# Patient Record
Sex: FEMALE | Race: WHITE | Employment: FULL TIME | URBAN - NONMETROPOLITAN AREA
[De-identification: names, ages, dates, MRNs, and addresses within clinical notes are randomized per-mention and may not be internally consistent; named-entity substitution may affect disease eponyms.]

---

## 2022-11-14 NOTE — PROGRESS NOTES
Chief Complaint   Patient presents with    Establish Care     Hasn't seen a physician in 10 years    Anxiety    Migraine    Gastroesophageal Reflux    Obesity       History obtained from the patient. SUBJECTIVE:  Lenon Landau is a 44 y.o. female that presents today for establishing care with new physician, etc. New patient, [facility] to [facility] @ [address]. -Anxiety:   Hx of   Was on zoloft 10 years ago  Off since then  Has episodes of panic  Denies sig depression  No SI/HI    Inciting events or triggers for anxiety - life/stress  Frequency of anxiety - daily  Panic attacks?: yes  Sleep Disturbances? : no  Impaired concentration?: yes  Substance abuse?: no  Suicidal/Homicidal Ideation?: no       -Chronic Headaches:    HPI:  Long hx  They do tend to cluster  Will go several months w/o and then will cluster  Gets aura  Pain not c/w cluster HA  Uses prn excedrine migraine  Has had 2 HA the last wk  Mild HA today  No lateralizing numbness or weakness    Description of headaches - frontal, throbbing  Frequency of Headaches - as above  Level of disability - moderate    Currently on prophylactic therapy? No  Taking abortive therapy? Yes    Associated Aura? Yes  Photophobia, Phonophobia? Yes  Nausea or Vomiting? Yes  Neurologic Symptoms? No  Worse with Valsalva? No  Recent change in Headaches? No  Do headaches awaken her from sleep? No      -GERD:    HPI:    Taking meds as prescribed ?: yes  Tolerating well ?: yes  Side Effects ?: denies  Dysphagia ?: denies  Odynophagia ?: denies  Melena ?: deies  Working on TLCS ?: yes      -Morbid obesity:  Diet not the best  Doesn't exercise  Ready to make changes.        Age/Gender Health Maintenance    Lipid - No results found for: CHOL  No results found for: TRIG  No results found for: HDL  No results found for: LDLCHOLESTEROL, LDLCALC  No results found for: LABVLDL, VLDL  No results found for: CHOLHDLRATIO    DM Screen - No results found for: GLUCOSE    Colon Cancer Screening - age 38  Lung Cancer Screening - never smoker    Tetanus - UTD NOV 2022  Influenza Vaccine - UTD NOV 2022  Pneumonia Vaccine - age 71  Zoster - age 47     Breast Cancer Screening - age 35  Cervical Cancer Screening - pt wants to think about it and discuss next visit (NOV 2022)  Osteoporosis Screening - age 71      Current Outpatient Medications   Medication Sig Dispense Refill    omeprazole (PRILOSEC) 20 MG delayed release capsule Take 20 mg by mouth daily      Aspirin-Acetaminophen-Caffeine (EXCEDRIN MIGRAINE PO) Take by mouth      Naproxen Sodium (ALEVE) 220 MG CAPS Take by mouth PRN      escitalopram (LEXAPRO) 10 MG tablet Take 1 tablet by mouth daily 30 tablet 2    SUMAtriptan (IMITREX) 50 MG tablet Take 1 tab by mouth at onset of migraine. May repeat x 1 in two hours. Max 3 tabs in 24 hours/ 18 tablet 3     No current facility-administered medications for this visit. Orders Placed This Encounter   Medications    escitalopram (LEXAPRO) 10 MG tablet     Sig: Take 1 tablet by mouth daily     Dispense:  30 tablet     Refill:  2    SUMAtriptan (IMITREX) 50 MG tablet     Sig: Take 1 tab by mouth at onset of migraine. May repeat x 1 in two hours. Max 3 tabs in 24 hours/     Dispense:  18 tablet     Refill:  3       All medications reviewed and reconciled, including OTC and herbal medications. Updated list given to patient. Patient Active Problem List    Diagnosis Date Noted    GABRIELA (generalized anxiety disorder) 11/15/2022    Migraine headache 11/15/2022    Morbid obesity ([address] 11/15/2022    GERD (gastroesophageal reflux disease)        Past Medical History:   Diagnosis Date    GABRIELA (generalized anxiety disorder)     GERD (gastroesophageal reflux disease)     Migraine headache     Morbid obesity ([address]        History reviewed. No pertinent surgical history.       Allergies   Allergen Reactions    Pcn [Penicillins]        Social History     Tobacco Use    Smoking status: Never    Smokeless tobacco: Never   Substance Use Topics    Alcohol use: Not Currently       Family History   Problem Relation Age of Onset    Migraines Mother     High Blood Pressure Mother     Mult Sclerosis Father     Colon Cancer Maternal Grandfather 62    Breast Cancer Neg Hx          I have reviewed the patient's past medical history, past surgical history, allergies, medications, social and family history and I have made updates where appropriate. Review of Systems  Positive responses are highlighted in bold    Constitutional:  Fever, Chills, Night Sweats, Fatigue, Unexpected changes in weight  Eyes:  Eye discharge, Eye pain, Eye redness, Visual disturbances   HENT:  Ear pain, Tinnitus, Nosebleeds, Trouble swallowing, Hearing loss, Sore throat  Cardiovascular:  Chest Pain, Palpitations, Orthopnea, Paroxysmal Nocturnal Dyspnea  Respiratory:  Cough, Wheezing, Shortness of breath, Chest tightness, Apnea  Gastrointestinal:  Nausea, Vomiting, Diarrhea, Constipation, Heartburn, Blood in stool  Genitourinary:  Difficulty or painful urination, Flank pain, Change in frequency, Urgency  Skin:  Color change, Rash, Itching, Wound  Psychiatric:  Hallucinations, Anxiety, Depression, Suicidal ideation  Hematological:  Enlarged glands, Easy bleeding, Easily bruising  Musculoskeletal:  Joint pain, Back pain, Gait problems, Joint swelling, Myalgias  Neurological:  Dizziness, Headaches, Presyncope, Numbness, Seizures, Tremors  Allergy:  Environmental allergies, Food allergies  Endocrine:  Heat Intolerance, Cold Intolerance, Polydipsia, Polyphagia, Polyuria      PHYSICAL EXAM:  Vitals:    11/15/22 0914   BP: 124/76   Pulse: 95   Resp: 14   Temp: 97.5 °F (36.4 °C)   TempSrc: Oral   SpO2: 99%   Weight: (!) 302 lb 12.8 oz (137.3 kg)   Height: 5' 7\" (1.702 m)     Body mass index is 47.43 kg/m².        VS Reviewed  General Appearance: A&O x 3, No acute distress,well developed and well- nourished  Head: normocephalic and atraumatic  Eyes: pupils equal, round, and reactive to light, extraocular eye movements intact, conjunctivae and eye lids without erythema  ENT: external ear and ear canal clear bilaterally, TMs intact and regular, nose without deformity, nasal mucosa and turbinates normal without polyps, oropharynx normal, dentition is normal for age  Neck: supple and non-tender without mass, no thyromegaly or thyroid nodules, no cervical lymphadenopathy  Pulmonary/Chest: clear to auscultation bilaterally- no wheezes, rales or rhonchi, normal air movement, no respiratory distress or retractions  Cardiovascular: S1 and S2 auscultated w/ RRR. No murmurs, rubs, clicks, or gallops, distal pulses intact. Abdomen: soft, non-tender, non-distended, bowel sounds physiologic,  no rebound or guarding, no masses or hernias noted. Liver and spleen without enlargement. Extremities: no cyanosis, clubbing or edema of the lower extremities. +2 PT/DP bilaterally. Musculoskeletal: No joint swelling or gross deformity   Psych: Affect appropriate. Mood anxious. Thought process is normal without evidence of depression or psychosis. Good insight and appropriate interaction. Cognition and memory appear to be intact. Skin: warm and dry, no rash or erythema  Lymph:  No cervical, auricular or supraclavicular lymph nodes palpated  Neuro Exam:   Cranial [id number] Intact.    Cranial nerve II: Normal Visual Acuity   Cranial nerve III: Pupils: equal, round, reactive to light   Cranial nerves III, IV, VI: Extraocular Movements: intact   Cranial nerve V: Facial sensation: intact   Cranial nerve VII:Facial strength: intact   Cranial nerve VIII: Hearing: intact   Cranial nerve IX: Palate Elevation intact bilaterally  Cranial nerve XI: Shoulder shrug intact bilaterally  Cranial nerve XII: Tongue movement: normal     Muscle Strength Testing    Deltoid Right 5/5  Left 5/5  Biceps Right 5/5  Left 5/5  Triceps Right 5/5  Left 5/5  Wrist Extensors Right  5/5  Left 5/5   Flexor Digitorum Profundus Right 5/5  Left 5/5  Hand Intrinsics Right 5/5  Left 5/5  Hip Flexors Right 5/5  Left 5/5  Quadriceps Right 5/5  Left 5/5  Anterior Tibialis Right 5/5  Left 5/5  Extensor Hallucis Longus Right 5/5  Left 5/5  Gastrocnemius/Soleus Right 5/5  Left 5/5     Sensory Testing    C5 Right 0=Normal; no sensory loss Left 0=Normal; no sensory loss  C6 Right 0=Normal; no sensory loss Left 0=Normal; no sensory loss  C7 Right 0=Normal; no sensory loss Left 0=Normal; no sensory loss  C8 Right 0=Normal; no sensory loss Left 0=Normal; no sensory loss  T1 Right 0=Normal; no sensory loss Left 0=Normal; no sensory loss    L2 Right 0=Normal; no sensory loss Left 0=Normal; no sensory loss  L3 Right 0=Normal; no sensory loss Left 0=Normal; no sensory loss  L4 Right 0=Normal; no sensory loss Left 0=Normal; no sensory loss  L5 Right 0=Normal; no sensory loss Left 0=Normal; no sensory loss  S1 Right 0=Normal; no sensory loss Left 0=Normal; no sensory loss     Cerebellar Testing    Finger to Nose:  Right  WNL Yes;  Left WNL  Yes  Heel to Rodriguez WNL: Right  WNL  Yes;  Left WNL  Yes     Deep Tendon Reflexes 2/4 equal and symmetric throughout   Clonus:  No  Decreased arm swing No   Shuffling gait No  Narrow base of support No  Able to stand without using arms  Yes  Bradykinesia  No  Tremor No  Increased tone at wrist especially with opening/closing opposite hand  No      ASSESSMENT & PLAN  1. GABRIELA (generalized anxiety disorder)    Worse over time  Trial lexapro 10mg  Reassess 6 wks    - escitalopram (LEXAPRO) 10 MG tablet; Take 1 tablet by mouth daily  Dispense: 30 tablet; Refill: 2    2. Migraine with aura and without status migrainosus, not intractable    Reassuring hx and exam  Trial prn imitrex  Reassess 6 wks    - SUMAtriptan (IMITREX) 50 MG tablet; Take 1 tab by mouth at onset of migraine. May repeat x 1 in two hours. Max 3 tabs in 24 hours/  Dispense: 18 tablet; Refill: 3    3.  Gastroesophageal reflux disease, unspecified whether esophagitis present    Stable  Con't omeprazole    4. Morbid obesity with BMI of 45.0-49.9, adult ([facility] [address]    Discussed wt loss strategies  Labs ordered  F/u 6 wks    - CBC with Auto Differential; Future  - Comprehensive Metabolic Panel; Future  - Lipid Panel; Future  - TSH with Reflex; Future      DISPOSITION    Return in about 6 weeks (around 12/27/2022) for f/u anxiety and headaches, sooner as needed. Irais Niharika released without restrictions. PATIENT COUNSELING    Counseling was provided today regarding the following topics: Healthy eating habits, Regular exercise, substance abuse and healthy sleep habits. Barriers to learning and self management: none    Discussed use, benefit, and side effects of prescribed medications. Barriers to medication compliance addressed. All patient questions answered. Pt voiced understanding.        Electronically signed by Shirlene Lanes, DO on 11/15/2022 at 9:59 AM

## 2022-11-15 ENCOUNTER — OFFICE VISIT (OUTPATIENT)
Dept: FAMILY MEDICINE CLINIC | Age: 37
End: 2022-11-15
Payer: COMMERCIAL

## 2022-11-15 VITALS
OXYGEN SATURATION: 99 % | SYSTOLIC BLOOD PRESSURE: 124 MMHG | WEIGHT: 293 LBS | HEIGHT: 67 IN | RESPIRATION RATE: 14 BRPM | DIASTOLIC BLOOD PRESSURE: 76 MMHG | TEMPERATURE: 97.5 F | HEART RATE: 95 BPM | BODY MASS INDEX: 45.99 KG/M2

## 2022-11-15 DIAGNOSIS — E66.01 MORBID OBESITY WITH BMI OF 45.0-49.9, ADULT (HCC): ICD-10-CM

## 2022-11-15 DIAGNOSIS — F41.1 GAD (GENERALIZED ANXIETY DISORDER): Primary | ICD-10-CM

## 2022-11-15 DIAGNOSIS — K21.9 GASTROESOPHAGEAL REFLUX DISEASE, UNSPECIFIED WHETHER ESOPHAGITIS PRESENT: ICD-10-CM

## 2022-11-15 DIAGNOSIS — G43.109 MIGRAINE WITH AURA AND WITHOUT STATUS MIGRAINOSUS, NOT INTRACTABLE: ICD-10-CM

## 2022-11-15 PROBLEM — G43.909 MIGRAINE HEADACHE: Chronic | Status: ACTIVE | Noted: 2022-11-15

## 2022-11-15 PROBLEM — G43.909 MIGRAINE HEADACHE: Status: ACTIVE | Noted: 2022-11-15

## 2022-11-15 PROCEDURE — 90674 CCIIV4 VAC NO PRSV 0.5 ML IM: CPT | Performed by: FAMILY MEDICINE

## 2022-11-15 PROCEDURE — 99204 OFFICE O/P NEW MOD 45 MIN: CPT | Performed by: FAMILY MEDICINE

## 2022-11-15 PROCEDURE — 90472 IMMUNIZATION ADMIN EACH ADD: CPT | Performed by: FAMILY MEDICINE

## 2022-11-15 PROCEDURE — 90715 TDAP VACCINE 7 YRS/> IM: CPT | Performed by: FAMILY MEDICINE

## 2022-11-15 PROCEDURE — 90471 IMMUNIZATION ADMIN: CPT | Performed by: FAMILY MEDICINE

## 2022-11-15 RX ORDER — ESCITALOPRAM OXALATE 10 MG/1
10 TABLET ORAL DAILY
Qty: 30 TABLET | Refills: 2 | Status: SHIPPED | OUTPATIENT
Start: 2022-11-15

## 2022-11-15 RX ORDER — SUMATRIPTAN 50 MG/1
TABLET, FILM COATED ORAL
Qty: 18 TABLET | Refills: 3 | Status: SHIPPED | OUTPATIENT
Start: 2022-11-15

## 2022-11-15 RX ORDER — COVID-19 ANTIGEN TEST
KIT MISCELLANEOUS
COMMUNITY

## 2022-11-15 RX ORDER — OMEPRAZOLE 20 MG/1
20 CAPSULE, DELAYED RELEASE ORAL DAILY
COMMUNITY

## 2022-11-15 ASSESSMENT — PATIENT HEALTH QUESTIONNAIRE - PHQ9
SUM OF ALL RESPONSES TO PHQ QUESTIONS 1-9: 1
SUM OF ALL RESPONSES TO PHQ QUESTIONS 1-9: 1
SUM OF ALL RESPONSES TO PHQ9 QUESTIONS 1 & 2: 1
2. FEELING DOWN, DEPRESSED OR HOPELESS: 1
1. LITTLE INTEREST OR PLEASURE IN DOING THINGS: 0
SUM OF ALL RESPONSES TO PHQ QUESTIONS 1-9: 1
SUM OF ALL RESPONSES TO PHQ QUESTIONS 1-9: 1

## 2022-11-15 NOTE — PROGRESS NOTES
Vaccine Information Sheet, \"Influenza - Inactivated\"  given to Judge Khan, or parent/legal guardian of  Judge Khan and verbalized understanding. Patient responses:    Have you ever had a reaction to a flu vaccine? No  Do you have an allergy to eggs, neomycin or polymixin? No  Do you have an allergy to Thimerosal, contact lens solution, or Merthiolate? No  Have you ever had Guillian [place] Syndrome? No  Do you have any current illness? No  Do you have a temperature above 100 degrees? No  Are you pregnant? No  If pregnant, permission obtained from physician? No  Do you have an active neurological disorder? No      Flu vaccine given per order. Please see immunization tab.

## 2022-11-15 NOTE — PATIENT INSTRUCTIONS
LAB INSTRUCTIONS:    Please complete labs within 4 week(s). Please fast for 8 hours prior to lab collection. The clinic will call you within 1 week of collection. If you have not heard from us within that amount of time, please call us at [phone].

## 2022-12-09 ENCOUNTER — NURSE ONLY (OUTPATIENT)
Dept: LAB | Age: 37
End: 2022-12-09

## 2022-12-09 DIAGNOSIS — E66.01 MORBID OBESITY WITH BMI OF 45.0-49.9, ADULT (HCC): ICD-10-CM

## 2022-12-09 LAB
ALBUMIN SERPL-MCNC: 4 G/DL (ref 3.5–5.1)
ALP BLD-CCNC: 65 U/L (ref 38–126)
ALT SERPL-CCNC: 28 U/L (ref 11–66)
ANION GAP SERPL CALCULATED.3IONS-SCNC: 10 MEQ/L (ref 8–16)
AST SERPL-CCNC: 15 U/L (ref 5–40)
BASOPHILS # BLD: 0.4 %
BASOPHILS ABSOLUTE: 0 THOU/MM3 (ref 0–0.1)
BILIRUB SERPL-MCNC: 0.6 MG/DL (ref 0.3–1.2)
BUN BLDV-MCNC: 17 MG/DL (ref 7–22)
CALCIUM SERPL-MCNC: 8.7 MG/DL (ref 8.5–10.5)
CHLORIDE BLD-SCNC: 102 MEQ/L (ref 98–111)
CHOLESTEROL, TOTAL: 178 MG/DL (ref 100–199)
CO2: 24 MEQ/L (ref 23–33)
CREAT SERPL-MCNC: 0.6 MG/DL (ref 0.4–1.2)
EOSINOPHIL # BLD: 0.7 %
EOSINOPHILS ABSOLUTE: 0 THOU/MM3 (ref 0–0.4)
ERYTHROCYTE [DISTWIDTH] IN BLOOD BY AUTOMATED COUNT: 12.2 % (ref 11.5–14.5)
ERYTHROCYTE [DISTWIDTH] IN BLOOD BY AUTOMATED COUNT: 41.5 FL (ref 35–45)
GFR SERPL CREATININE-BSD FRML MDRD: > 60 ML/MIN/1.73M2
GLUCOSE BLD-MCNC: 96 MG/DL (ref 70–108)
HCT VFR BLD CALC: 44.7 % (ref 37–47)
HDLC SERPL-MCNC: 56 MG/DL
HEMOGLOBIN: 14.6 GM/DL (ref 12–16)
IMMATURE GRANS (ABS): 0.02 THOU/MM3 (ref 0–0.07)
IMMATURE GRANULOCYTES: 0.4 %
LDL CHOLESTEROL CALCULATED: 104 MG/DL
LYMPHOCYTES # BLD: 22.4 %
LYMPHOCYTES ABSOLUTE: 1.3 THOU/MM3 (ref 1–4.8)
MCH RBC QN AUTO: 30.1 PG (ref 26–33)
MCHC RBC AUTO-ENTMCNC: 32.7 GM/DL (ref 32.2–35.5)
MCV RBC AUTO: 92.2 FL (ref 81–99)
MONOCYTES # BLD: 10.8 %
MONOCYTES ABSOLUTE: 0.6 THOU/MM3 (ref 0.4–1.3)
NUCLEATED RED BLOOD CELLS: 0 /100 WBC
PLATELET # BLD: 263 THOU/MM3 (ref 130–400)
PMV BLD AUTO: 11 FL (ref 9.4–12.4)
POTASSIUM SERPL-SCNC: 4.3 MEQ/L (ref 3.5–5.2)
RBC # BLD: 4.85 MILL/MM3 (ref 4.2–5.4)
SEG NEUTROPHILS: 65.3 %
SEGMENTED NEUTROPHILS ABSOLUTE COUNT: 3.7 THOU/MM3 (ref 1.8–7.7)
SODIUM BLD-SCNC: 136 MEQ/L (ref 135–145)
TOTAL PROTEIN: 5.9 G/DL (ref 6.1–8)
TRIGL SERPL-MCNC: 91 MG/DL (ref 0–199)
TSH SERPL DL<=0.05 MIU/L-ACNC: 2.47 UIU/ML (ref 0.4–4.2)
WBC # BLD: 5.7 THOU/MM3 (ref 4.8–10.8)

## 2022-12-12 ENCOUNTER — TELEPHONE (OUTPATIENT)
Dept: FAMILY MEDICINE CLINIC | Age: 37
End: 2022-12-12

## 2022-12-12 NOTE — TELEPHONE ENCOUNTER
----- Message from Nick Dolan, DO sent at 12/11/2022  9:06 AM EST -----  Please let pt know that labs look good  No concerning findings. Let me know if questions, thanks!

## 2023-02-14 SDOH — ECONOMIC STABILITY: TRANSPORTATION INSECURITY
IN THE PAST 12 MONTHS, HAS LACK OF TRANSPORTATION KEPT YOU FROM MEETINGS, WORK, OR FROM GETTING THINGS NEEDED FOR DAILY LIVING?: NO

## 2023-02-14 SDOH — ECONOMIC STABILITY: FOOD INSECURITY: WITHIN THE PAST 12 MONTHS, THE FOOD YOU BOUGHT JUST DIDN'T LAST AND YOU DIDN'T HAVE MONEY TO GET MORE.: NEVER TRUE

## 2023-02-14 SDOH — ECONOMIC STABILITY: FOOD INSECURITY: WITHIN THE PAST 12 MONTHS, YOU WORRIED THAT YOUR FOOD WOULD RUN OUT BEFORE YOU GOT MONEY TO BUY MORE.: NEVER TRUE

## 2023-02-14 SDOH — ECONOMIC STABILITY: HOUSING INSECURITY
IN THE LAST 12 MONTHS, WAS THERE A TIME WHEN YOU DID NOT HAVE A STEADY PLACE TO SLEEP OR SLEPT IN A SHELTER (INCLUDING NOW)?: NO

## 2023-02-14 SDOH — ECONOMIC STABILITY: INCOME INSECURITY: HOW HARD IS IT FOR YOU TO PAY FOR THE VERY BASICS LIKE FOOD, HOUSING, MEDICAL CARE, AND HEATING?: NOT VERY HARD

## 2023-02-14 NOTE — PROGRESS NOTES
Chief Complaint   Patient presents with    Follow-up     Anxiety, migraines and wt       History obtained from the patient. SUBJECTIVE:  Doc Valenzuela is a 44 y.o. female that presents today for     -Anxiety LAST VISIT:   Hx of   Was on zoloft 10 years ago  Off since then  Has episodes of panic  Denies sig depression  No SI/HI    Inciting events or triggers for anxiety - life/stress  Frequency of anxiety - daily  Panic attacks?: yes  Sleep Disturbances? : no  Impaired concentration?: yes  Substance abuse?: no  Suicidal/Homicidal Ideation?: no     UPDATE TODAY:   Started on Lexapro 10mg last visit  Good response thus far  Still having some anxiety, however  Would like to inc dose  No side effects       -Chronic Headaches LAST VISIT:    HPI:  Long hx  They do tend to cluster  Will go several months w/o and then will cluster  Gets aura  Pain not c/w cluster HA  Uses prn excedrine migraine  Has had 2 HA the last wk  Mild HA today  No lateralizing numbness or weakness    Description of headaches - frontal, throbbing  Frequency of Headaches - as above  Level of disability - moderate    Currently on prophylactic therapy? No  Taking abortive therapy? Yes    Associated Aura? Yes  Photophobia, Phonophobia? Yes  Nausea or Vomiting? Yes  Neurologic Symptoms? No  Worse with Valsalva? No  Recent change in Headaches? No  Do headaches awaken her from sleep? No    UPDATE TODAY:   SWARTZ's doing better  2 in the last 6 wks  Imitrex helpful       -Morbid obesity:  Diet not the best  Doesn't exercise  Ready to make changes.    Labs appropriate      Age/Gender Health Maintenance    Lipid -   Lab Results   Component Value Date    CHOL 178 12/09/2022     Lab Results   Component Value Date    TRIG 91 12/09/2022     Lab Results   Component Value Date    HDL 56 12/09/2022     Lab Results   Component Value Date    LDLCALC 104 12/09/2022     No results found for: LABVLDL, VLDL  No results found for: CHOLHDLRATIO      DM Screen - Lab Results   Component Value Date/Time    GLUCOSE 96 12/09/2022 08:33 AM       Colon Cancer Screening - age 38  Lung Cancer Screening - never smoker    Tetanus - UTD NOV 2022  Influenza Vaccine - UTD NOV 2022  Pneumonia Vaccine - age 71  Zoster - age 47     Breast Cancer Screening - age 35  Cervical Cancer Screening - pt wants to think about it and discuss next visit (NOV 2022/FEB 2023)  Osteoporosis Screening - age 71      Current Outpatient Medications   Medication Sig Dispense Refill    escitalopram (LEXAPRO) 20 MG tablet Take 1 tablet by mouth daily 30 tablet 2    omeprazole (PRILOSEC) 20 MG delayed release capsule Take 20 mg by mouth daily      Aspirin-Acetaminophen-Caffeine (EXCEDRIN MIGRAINE PO) Take by mouth      Naproxen Sodium 220 MG CAPS Take by mouth PRN      SUMAtriptan (IMITREX) 50 MG tablet Take 1 tab by mouth at onset of migraine. May repeat x 1 in two hours. Max 3 tabs in 24 hours/ 18 tablet 3     No current facility-administered medications for this visit. Orders Placed This Encounter   Medications    escitalopram (LEXAPRO) 20 MG tablet     Sig: Take 1 tablet by mouth daily     Dispense:  30 tablet     Refill:  2         All medications reviewed and reconciled, including OTC and herbal medications. Updated list given to patient. Patient Active Problem List    Diagnosis Date Noted    GABRIELA (generalized anxiety disorder) 11/15/2022    Migraine headache 11/15/2022    Morbid obesity ([address] 11/15/2022    GERD (gastroesophageal reflux disease)        Past Medical History:   Diagnosis Date    GABRIELA (generalized anxiety disorder)     GERD (gastroesophageal reflux disease)     Migraine headache     Morbid obesity ([address]        History reviewed. No pertinent surgical history.       Allergies   Allergen Reactions    Pcn [Penicillins]        Social History     Tobacco Use    Smoking status: Never    Smokeless tobacco: Never   Substance Use Topics    Alcohol use: Not Currently       Family History Problem Relation Age of Onset    Migraines Mother     High Blood Pressure Mother     Mult Sclerosis Father     Colon Cancer Maternal Grandfather 62    Breast Cancer Neg Hx          I have reviewed the patient's past medical history, past surgical history, allergies, medications, social and family history and I have made updates where appropriate. Review of Systems  Positive responses are highlighted in bold    Constitutional:  Fever, Chills, Night Sweats, Fatigue, Unexpected changes in weight  HENT:  Ear pain, Tinnitus, Nosebleeds, Trouble swallowing, Hearing loss, Sore throat  Cardiovascular:  Chest Pain, Palpitations, Orthopnea, Paroxysmal Nocturnal Dyspnea  Respiratory:  Cough, Wheezing, Shortness of breath, Chest tightness, Apnea  Gastrointestinal:  Nausea, Vomiting, Diarrhea, Constipation, Heartburn, Blood in stool  Genitourinary:  Difficulty or painful urination, Flank pain, Change in frequency, Urgency  Skin:  Color change, Rash, Itching, Wound  Musculoskeletal:  Joint pain, Back pain, Gait problems, Joint swelling, Myalgias  Neurological:  Dizziness, Headaches, Presyncope, Numbness, Seizures, Tremors  Endocrine:  Heat Intolerance, Cold Intolerance, Polydipsia, Polyphagia, Polyuria      PHYSICAL EXAM:  Vitals:    02/15/23 0839   BP: 130/82   Pulse: 78   Resp: 16   Temp: 98.2 °F (36.8 °C)   SpO2: 99%   Weight: 290 lb (131.5 kg)   Height: 5' 7\" (1.702 m)     Body mass index is 45.42 kg/m².        VS Reviewed  General Appearance: A&O x 3, No acute distress,well developed and well- nourished  Eyes: pupils equal, round, and reactive to light, extraocular eye movements intact, conjunctivae and eye lids without erythema  ENT: external ear and ear canal clear bilaterally, TMs intact and regular, nose without deformity, nasal mucosa and turbinates normal without polyps, oropharynx normal, dentition is normal for age  Neck: supple and non-tender without mass, no thyromegaly or thyroid nodules, no cervical lymphadenopathy  Pulmonary/Chest: clear to auscultation bilaterally- no wheezes, rales or rhonchi, normal air movement, no respiratory distress or retractions  Cardiovascular: S1 and S2 auscultated w/ RRR. No murmurs, rubs, clicks, or gallops, distal pulses intact. Abdomen: soft, non-tender, non-distended, bowel sounds physiologic,  no rebound or guarding, no masses or hernias noted. Liver and spleen without enlargement. Extremities: no cyanosis, clubbing or edema of the lower extremities. Skin: warm and dry, no rash or erythema  Psych: Affect appropriate. Mood mildly anxious. Thought process is normal without evidence of depression or psychosis. Good insight and appropriate interaction. Cognition and memory appear to be intact. ASSESSMENT & PLAN  1. GABRIELA (generalized anxiety disorder)    Good response thus far but incomplete  Inc lexapro to 20mg  Reassess 6 wks    - escitalopram (LEXAPRO) 20 MG tablet; Take 1 tablet by mouth daily  Dispense: 30 tablet; Refill: 2    2. Migraine with aura and without status migrainosus, not intractable    Doing better  Con't prn imitrex    3. Morbid obesity with BMI of 45.0-49.9, adult ([facility] [address]    Discussed wt loss strategies today      DISPOSITION    Return in about 6 weeks (around 3/29/2023) for f/u anxiety and inc lexapro dose, sooner as needed. Emilee Ambrosia released without restrictions. PATIENT COUNSELING    Counseling was provided today regarding the following topics: Healthy eating habits, Regular exercise, substance abuse and healthy sleep habits. Barriers to learning and self management: none    Discussed use, benefit, and side effects of prescribed medications. Barriers to medication compliance addressed. All patient questions answered. Pt voiced understanding.        Electronically signed by Storm [place] DO on 2/15/2023 at 8:55 AM

## 2023-02-15 ENCOUNTER — OFFICE VISIT (OUTPATIENT)
Dept: FAMILY MEDICINE CLINIC | Age: 38
End: 2023-02-15
Payer: COMMERCIAL

## 2023-02-15 VITALS
BODY MASS INDEX: 45.52 KG/M2 | DIASTOLIC BLOOD PRESSURE: 82 MMHG | WEIGHT: 290 LBS | HEIGHT: 67 IN | TEMPERATURE: 98.2 F | OXYGEN SATURATION: 99 % | SYSTOLIC BLOOD PRESSURE: 130 MMHG | HEART RATE: 78 BPM | RESPIRATION RATE: 16 BRPM

## 2023-02-15 DIAGNOSIS — E66.01 MORBID OBESITY WITH BMI OF 45.0-49.9, ADULT (HCC): ICD-10-CM

## 2023-02-15 DIAGNOSIS — F41.1 GAD (GENERALIZED ANXIETY DISORDER): Primary | ICD-10-CM

## 2023-02-15 DIAGNOSIS — G43.109 MIGRAINE WITH AURA AND WITHOUT STATUS MIGRAINOSUS, NOT INTRACTABLE: ICD-10-CM

## 2023-02-15 PROCEDURE — 99214 OFFICE O/P EST MOD 30 MIN: CPT | Performed by: FAMILY MEDICINE

## 2023-02-15 RX ORDER — ESCITALOPRAM OXALATE 20 MG/1
20 TABLET ORAL DAILY
Qty: 30 TABLET | Refills: 2 | Status: SHIPPED | OUTPATIENT
Start: 2023-02-15

## 2023-02-15 ASSESSMENT — PATIENT HEALTH QUESTIONNAIRE - PHQ9
SUM OF ALL RESPONSES TO PHQ QUESTIONS 1-9: 0
SUM OF ALL RESPONSES TO PHQ QUESTIONS 1-9: 0
1. LITTLE INTEREST OR PLEASURE IN DOING THINGS: 0
2. FEELING DOWN, DEPRESSED OR HOPELESS: 0
SUM OF ALL RESPONSES TO PHQ9 QUESTIONS 1 & 2: 0
SUM OF ALL RESPONSES TO PHQ QUESTIONS 1-9: 0
SUM OF ALL RESPONSES TO PHQ QUESTIONS 1-9: 0

## 2023-10-02 NOTE — PROGRESS NOTES
Chief Complaint   Patient presents with    Follow-up     Issues noted below     History obtained from the patient. SUBJECTIVE:  Alvira Boast is a 43 y.o. female that presents today for     -Anxiety PRIOR VISIT:   Hx of   Was on zoloft 10 years ago  Off since then  Has episodes of panic  Denies sig depression  No SI/HI    Inciting events or triggers for anxiety - life/stress  Frequency of anxiety - daily  Panic attacks?: yes  Sleep Disturbances? : no  Impaired concentration?: yes  Substance abuse?: no  Suicidal/Homicidal Ideation?: no     UPDATE LAST VISIT:   Started on Lexapro 10mg last visit  Good response thus far  Still having some anxiety, however  Would like to inc dose  No side effects     UPDATE TODAY:   Overall doing ok  On lexapro 20  Has some inc stress at home, so having some inc panic recently  Doesn't want to change lexapro  Is asking to try something prn  No SI/HI       -Chronic Headaches PRIOR VISIT:    HPI:  Long hx  They do tend to cluster  Will go several months w/o and then will cluster  Gets aura  Pain not c/w cluster HA  Uses prn excedrine migraine  Has had 2 HA the last wk  Mild HA today  No lateralizing numbness or weakness    Description of headaches - frontal, throbbing  Frequency of Headaches - as above  Level of disability - moderate    Currently on prophylactic therapy? No  Taking abortive therapy? Yes    Associated Aura? Yes  Photophobia, Phonophobia? Yes  Nausea or Vomiting? Yes  Neurologic Symptoms? No  Worse with Valsalva? No  Recent change in Headaches? No  Do headaches awaken her from sleep?   No    UPDATE LAST VISIT:   SWARTZ's doing better  2 in the last 6 wks  Imitrex helpful     UPDATE TODAY:   Having 3 migraines per month, aborts with imitrex  However, over the last 6 months having headaches more days than not  Takes quite a bit of excedrine for these, helps but them HA's come back  Those are mild, frontal, but annoying  No lateralizing numbness or weakness  Not

## 2023-10-03 ENCOUNTER — OFFICE VISIT (OUTPATIENT)
Dept: FAMILY MEDICINE CLINIC | Age: 38
End: 2023-10-03
Payer: COMMERCIAL

## 2023-10-03 VITALS
WEIGHT: 282 LBS | DIASTOLIC BLOOD PRESSURE: 74 MMHG | HEART RATE: 75 BPM | HEIGHT: 67 IN | OXYGEN SATURATION: 98 % | SYSTOLIC BLOOD PRESSURE: 122 MMHG | TEMPERATURE: 98 F | RESPIRATION RATE: 16 BRPM | BODY MASS INDEX: 44.26 KG/M2

## 2023-10-03 DIAGNOSIS — K21.9 GASTROESOPHAGEAL REFLUX DISEASE, UNSPECIFIED WHETHER ESOPHAGITIS PRESENT: ICD-10-CM

## 2023-10-03 DIAGNOSIS — F41.1 GAD (GENERALIZED ANXIETY DISORDER): Primary | ICD-10-CM

## 2023-10-03 DIAGNOSIS — G44.40 ANALGESIC REBOUND HEADACHE: ICD-10-CM

## 2023-10-03 DIAGNOSIS — Z23 NEEDS FLU SHOT: ICD-10-CM

## 2023-10-03 DIAGNOSIS — E66.01 MORBID OBESITY WITH BMI OF 40.0-44.9, ADULT (HCC): ICD-10-CM

## 2023-10-03 DIAGNOSIS — T39.95XA ANALGESIC REBOUND HEADACHE: ICD-10-CM

## 2023-10-03 DIAGNOSIS — F41.0 PANIC ATTACKS: ICD-10-CM

## 2023-10-03 DIAGNOSIS — G43.109 MIGRAINE WITH AURA AND WITHOUT STATUS MIGRAINOSUS, NOT INTRACTABLE: ICD-10-CM

## 2023-10-03 PROCEDURE — 99214 OFFICE O/P EST MOD 30 MIN: CPT | Performed by: FAMILY MEDICINE

## 2023-10-03 PROCEDURE — 90674 CCIIV4 VAC NO PRSV 0.5 ML IM: CPT | Performed by: FAMILY MEDICINE

## 2023-10-03 PROCEDURE — 90471 IMMUNIZATION ADMIN: CPT | Performed by: FAMILY MEDICINE

## 2023-10-03 RX ORDER — HYDROXYZINE PAMOATE 25 MG/1
25 CAPSULE ORAL 3 TIMES DAILY PRN
Qty: 90 CAPSULE | Refills: 0 | Status: SHIPPED | OUTPATIENT
Start: 2023-10-03

## 2023-10-03 RX ORDER — RIBOFLAVIN (VITAMIN B2) 400 MG
1 TABLET ORAL DAILY
Qty: 30 TABLET | Refills: 2 | Status: SHIPPED | OUTPATIENT
Start: 2023-10-03

## 2023-10-03 RX ORDER — CALCIUM CARBONATE/VITAMIN D3 500-10/5ML
1 LIQUID (ML) ORAL DAILY
Qty: 30 CAPSULE | Refills: 2 | Status: SHIPPED | OUTPATIENT
Start: 2023-10-03

## 2023-10-03 NOTE — PROGRESS NOTES
Vaccine Information Sheet, \"Influenza - Inactivated\"  given to Josephine English, or parent/legal guardian of  Josephine English and verbalized understanding. Patient responses:    Have you ever had a reaction to a flu vaccine? No  Do you have an allergy to eggs, neomycin or polymixin? No  Do you have an allergy to Thimerosal, contact lens solution, or Merthiolate? No  Have you ever had Guillian [place] Syndrome? No  Do you have any current illness? No  Do you have a temperature above 100 degrees? No  Are you pregnant? No  If pregnant, permission obtained from physician? No  Do you have an active neurological disorder? No      Flu vaccine given per order. Please see immunization tab.

## 2023-11-22 ENCOUNTER — PATIENT MESSAGE (OUTPATIENT)
Dept: FAMILY MEDICINE CLINIC | Age: 38
End: 2023-11-22

## 2023-11-22 DIAGNOSIS — J01.90 ACUTE RHINOSINUSITIS: Primary | ICD-10-CM

## 2023-11-22 RX ORDER — DOXYCYCLINE HYCLATE 100 MG
100 TABLET ORAL 2 TIMES DAILY
Qty: 20 TABLET | Refills: 0 | Status: SHIPPED | OUTPATIENT
Start: 2023-11-22 | End: 2023-12-02

## 2023-11-22 RX ORDER — FLUTICASONE PROPIONATE 50 MCG
1 SPRAY, SUSPENSION (ML) NASAL DAILY
Qty: 16 G | Refills: 0 | Status: SHIPPED | OUTPATIENT
Start: 2023-11-22 | End: 2023-12-06

## 2023-12-04 DIAGNOSIS — J01.90 ACUTE RHINOSINUSITIS: ICD-10-CM

## 2023-12-04 RX ORDER — FLUTICASONE PROPIONATE 50 MCG
1 SPRAY, SUSPENSION (ML) NASAL DAILY
Qty: 16 G | Refills: 0 | Status: SHIPPED | OUTPATIENT
Start: 2023-12-04 | End: 2023-12-18

## 2023-12-04 RX ORDER — DOXYCYCLINE HYCLATE 100 MG
100 TABLET ORAL 2 TIMES DAILY
Qty: 14 TABLET | Refills: 0 | Status: SHIPPED | OUTPATIENT
Start: 2023-12-04 | End: 2023-12-11

## 2023-12-12 NOTE — PROGRESS NOTES
Chief Complaint   Patient presents with    Follow-up     Anxiety  HA's  Both doing better    Otalgia     Left ear     History obtained from the patient. SUBJECTIVE:  Ghada Larkin is a 43 y.o. female that presents today for     -Anxiety PRIOR VISIT:   Hx of   Was on zoloft 10 years ago  Off since then  Has episodes of panic  Denies sig depression  No SI/HI    Inciting events or triggers for anxiety - life/stress  Frequency of anxiety - daily  Panic attacks?: yes  Sleep Disturbances? : no  Impaired concentration?: yes  Substance abuse?: no  Suicidal/Homicidal Ideation?: no     UPDATE PRIOR VISIT:   Started on Lexapro 10mg last visit  Good response thus far  Still having some anxiety, however  Would like to inc dose  No side effects     UPDATE LAST VISIT:   Overall doing ok  On lexapro 20  Has some inc stress at home, so having some inc panic recently  Doesn't want to change lexapro  Is asking to try something prn  No SI/HI     UPDATE TODAY:   Anxiety is doing better  Only needed Vistaril once  Lexapro working well  No SI/HI       -Chronic Headaches PRIOR VISIT:    HPI:  Long hx  They do tend to cluster  Will go several months w/o and then will cluster  Gets aura  Pain not c/w cluster HA  Uses prn excedrine migraine  Has had 2 HA the last wk  Mild HA today  No lateralizing numbness or weakness    Description of headaches - frontal, throbbing  Frequency of Headaches - as above  Level of disability - moderate    Currently on prophylactic therapy? No  Taking abortive therapy? Yes    Associated Aura? Yes  Photophobia, Phonophobia? Yes  Nausea or Vomiting? Yes  Neurologic Symptoms? No  Worse with Valsalva? No  Recent change in Headaches? No  Do headaches awaken her from sleep?   No    UPDATE PRIOR VISIT:   SWARTZ's doing better  2 in the last 6 wks  Imitrex helpful     UPDATE LAST VISIT:   Having 3 migraines per month, aborts with imitrex  However, over the last 6 months having headaches more days than

## 2023-12-13 ENCOUNTER — OFFICE VISIT (OUTPATIENT)
Dept: FAMILY MEDICINE CLINIC | Age: 38
End: 2023-12-13
Payer: COMMERCIAL

## 2023-12-13 VITALS
OXYGEN SATURATION: 99 % | HEART RATE: 78 BPM | HEIGHT: 67 IN | RESPIRATION RATE: 18 BRPM | SYSTOLIC BLOOD PRESSURE: 126 MMHG | WEIGHT: 279 LBS | DIASTOLIC BLOOD PRESSURE: 76 MMHG | TEMPERATURE: 97.8 F | BODY MASS INDEX: 43.79 KG/M2

## 2023-12-13 DIAGNOSIS — F41.1 GAD (GENERALIZED ANXIETY DISORDER): Primary | ICD-10-CM

## 2023-12-13 DIAGNOSIS — T39.95XA ANALGESIC REBOUND HEADACHE: ICD-10-CM

## 2023-12-13 DIAGNOSIS — H69.82 EUSTACHIAN TUBE DYSFUNCTION, LEFT: ICD-10-CM

## 2023-12-13 DIAGNOSIS — G43.109 MIGRAINE WITH AURA AND WITHOUT STATUS MIGRAINOSUS, NOT INTRACTABLE: ICD-10-CM

## 2023-12-13 DIAGNOSIS — F41.0 PANIC ATTACKS: ICD-10-CM

## 2023-12-13 DIAGNOSIS — G44.40 ANALGESIC REBOUND HEADACHE: ICD-10-CM

## 2023-12-13 PROCEDURE — 99214 OFFICE O/P EST MOD 30 MIN: CPT | Performed by: FAMILY MEDICINE

## 2023-12-13 RX ORDER — SUMATRIPTAN 50 MG/1
TABLET, FILM COATED ORAL
Qty: 18 TABLET | Refills: 3 | Status: SHIPPED | OUTPATIENT
Start: 2023-12-13

## 2023-12-28 DIAGNOSIS — G43.109 MIGRAINE WITH AURA AND WITHOUT STATUS MIGRAINOSUS, NOT INTRACTABLE: ICD-10-CM

## 2023-12-28 DIAGNOSIS — T39.95XA ANALGESIC REBOUND HEADACHE: ICD-10-CM

## 2023-12-28 DIAGNOSIS — G44.40 ANALGESIC REBOUND HEADACHE: ICD-10-CM

## 2023-12-28 RX ORDER — LANOLIN ALCOHOL/MO/W.PET/CERES
400 CREAM (GRAM) TOPICAL DAILY
Qty: 90 TABLET | Refills: 0 | Status: SHIPPED | OUTPATIENT
Start: 2023-12-28

## 2023-12-28 NOTE — TELEPHONE ENCOUNTER
Recent Visits  Date Type Provider Dept   12/13/23 Office Visit Henna Olvera, DO [facility]   10/03/23 Office Visit Henna Olvera, DO [facility]   02/15/23 Office Visit Henna Olvera, DO [facility]   11/15/22 Office Visit Henna Olvera, DO [facility]   Showing recent visits within past 540 days with a meds authorizing provider and meeting all other requirements  Future Appointments  No visits were found meeting these conditions.   Showing future appointments within next 150 days with a meds authorizing provider and meeting all other requirements

## 2024-03-28 DIAGNOSIS — F41.1 GAD (GENERALIZED ANXIETY DISORDER): ICD-10-CM

## 2024-03-28 RX ORDER — ESCITALOPRAM OXALATE 20 MG/1
20 TABLET ORAL DAILY
Qty: 90 TABLET | Refills: 3 | Status: SHIPPED | OUTPATIENT
Start: 2024-03-28

## 2024-04-10 DIAGNOSIS — T39.95XA ANALGESIC REBOUND HEADACHE: ICD-10-CM

## 2024-04-10 DIAGNOSIS — G44.40 ANALGESIC REBOUND HEADACHE: ICD-10-CM

## 2024-04-10 DIAGNOSIS — G43.109 MIGRAINE WITH AURA AND WITHOUT STATUS MIGRAINOSUS, NOT INTRACTABLE: ICD-10-CM

## 2024-04-10 RX ORDER — MAGNESIUM OXIDE 400 MG/1
1 TABLET ORAL DAILY
Qty: 90 TABLET | Refills: 3 | Status: SHIPPED | OUTPATIENT
Start: 2024-04-10

## 2024-06-24 DIAGNOSIS — G43.109 MIGRAINE WITH AURA AND WITHOUT STATUS MIGRAINOSUS, NOT INTRACTABLE: ICD-10-CM

## 2024-06-24 RX ORDER — SUMATRIPTAN 50 MG/1
TABLET, FILM COATED ORAL
Qty: 18 TABLET | Refills: 3 | Status: SHIPPED | OUTPATIENT
Start: 2024-06-24

## 2024-06-24 NOTE — TELEPHONE ENCOUNTER
Recent Visits  Date Type Provider Dept   12/13/23 Office Visit Israel Diggs, DO [facility]   10/03/23 Office Visit Israel Diggs, DO [facility]   02/15/23 Office Visit Israel Diggs, DO [facility]   Showing recent visits within past 540 days with a meds authorizing provider and meeting all other requirements  Future Appointments  Date Type Provider Dept   08/06/24 Appointment Israel Diggs, DO [facility]   Showing future appointments within next 150 days with a meds authorizing provider and meeting all other requirements

## 2024-08-11 SDOH — ECONOMIC STABILITY: FOOD INSECURITY: WITHIN THE PAST 12 MONTHS, THE FOOD YOU BOUGHT JUST DIDN'T LAST AND YOU DIDN'T HAVE MONEY TO GET MORE.: NEVER TRUE

## 2024-08-11 SDOH — ECONOMIC STABILITY: INCOME INSECURITY: HOW HARD IS IT FOR YOU TO PAY FOR THE VERY BASICS LIKE FOOD, HOUSING, MEDICAL CARE, AND HEATING?: NOT VERY HARD

## 2024-08-11 SDOH — ECONOMIC STABILITY: FOOD INSECURITY: WITHIN THE PAST 12 MONTHS, YOU WORRIED THAT YOUR FOOD WOULD RUN OUT BEFORE YOU GOT MONEY TO BUY MORE.: NEVER TRUE

## 2024-08-11 ASSESSMENT — PATIENT HEALTH QUESTIONNAIRE - PHQ9
SUM OF ALL RESPONSES TO PHQ QUESTIONS 1-9: 0
2. FEELING DOWN, DEPRESSED OR HOPELESS: NOT AT ALL
1. LITTLE INTEREST OR PLEASURE IN DOING THINGS: NOT AT ALL
SUM OF ALL RESPONSES TO PHQ QUESTIONS 1-9: 0
SUM OF ALL RESPONSES TO PHQ QUESTIONS 1-9: 0
2. FEELING DOWN, DEPRESSED OR HOPELESS: NOT AT ALL
SUM OF ALL RESPONSES TO PHQ QUESTIONS 1-9: 0
SUM OF ALL RESPONSES TO PHQ9 QUESTIONS 1 & 2: 0
SUM OF ALL RESPONSES TO PHQ9 QUESTIONS 1 & 2: 0
1. LITTLE INTEREST OR PLEASURE IN DOING THINGS: NOT AT ALL

## 2024-08-12 NOTE — PROGRESS NOTES
Chief Complaint   Patient presents with    Follow-up     Chronic issues noted below    Rash     R foot     History obtained from the patient.    SUBJECTIVE:  Kari Batista is a 38 y.o. female that presents today for     -Anxiety:  Anxiety is doing welll  Lexapro working well  Not really needing Vistaril  No SI/HI       -Chronic Headaches  Doing well 3 to 4 HA per month  On B2/Mg++  Not needed imitrex much, but works well when needed      -Morbid obesity:  Diet doing better  Exercising more  Wts improving  Labs appropriate        -GERD:     HPI:     Taking meds as prescribed ?: yes  Tolerating well ?: yes  Side Effects ?: denies  Dysphagia ?: denies  Odynophagia ?: denies  Melena ?: denies  Working on TL[employer] ?: yes      -Rash:  Small red rash on top of R foot  Distal  Red  Circular  Itchy  Been there a few months      Age/Gender Health Maintenance    Lipid -   Lab Results   Component Value Date    CHOL 178 12/09/2022     Lab Results   Component Value Date    TRIG 91 12/09/2022     Lab Results   Component Value Date    HDL 56 12/09/2022     Lab Results   Component Value Date     12/09/2022       DM Screen -   Lab Results   Component Value Date/Time    GLUCOSE 96 12/09/2022 08:33 AM     No results found for: \"LABA1C\"      Colon Cancer Screening - age 45  Lung Cancer Screening - never smoker    Tetanus - UTD NOV 2022  Influenza Vaccine - UTD OCT 2023  Pneumonia Vaccine - age 65  Zoster - age 50     Breast Cancer Screening - age 39, ordered for SEPT 2024  Cervical Cancer Screening - pt wants to think about it and discuss next visit (NOV 2022/FEB 2023/OCT 2023/DEC 2023/AUG 2024)  Osteoporosis Screening - age 65      Current Outpatient Medications   Medication Sig Dispense Refill    triamcinolone (KENALOG) 0.1 % cream Apply topically 2 times daily for up to 4 weeks, then weekends only as needed. 45 g 0    escitalopram (LEXAPRO) 20 MG tablet Take 1 tablet by mouth daily 90 tablet 3    hydrOXYzine pamoate

## 2024-08-13 ENCOUNTER — OFFICE VISIT (OUTPATIENT)
Dept: FAMILY MEDICINE CLINIC | Age: 39
End: 2024-08-13
Payer: COMMERCIAL

## 2024-08-13 VITALS
BODY MASS INDEX: 42.44 KG/M2 | WEIGHT: 270.4 LBS | HEART RATE: 77 BPM | OXYGEN SATURATION: 97 % | TEMPERATURE: 97.9 F | SYSTOLIC BLOOD PRESSURE: 126 MMHG | DIASTOLIC BLOOD PRESSURE: 78 MMHG | RESPIRATION RATE: 18 BRPM | HEIGHT: 67 IN

## 2024-08-13 DIAGNOSIS — F41.1 GAD (GENERALIZED ANXIETY DISORDER): Primary | Chronic | ICD-10-CM

## 2024-08-13 DIAGNOSIS — L30.0 NUMMULAR ECZEMA: ICD-10-CM

## 2024-08-13 DIAGNOSIS — K21.9 GASTROESOPHAGEAL REFLUX DISEASE, UNSPECIFIED WHETHER ESOPHAGITIS PRESENT: ICD-10-CM

## 2024-08-13 DIAGNOSIS — F41.1 GAD (GENERALIZED ANXIETY DISORDER): ICD-10-CM

## 2024-08-13 DIAGNOSIS — Z12.31 ENCOUNTER FOR SCREENING MAMMOGRAM FOR MALIGNANT NEOPLASM OF BREAST: ICD-10-CM

## 2024-08-13 DIAGNOSIS — G43.109 MIGRAINE WITH AURA AND WITHOUT STATUS MIGRAINOSUS, NOT INTRACTABLE: ICD-10-CM

## 2024-08-13 DIAGNOSIS — E66.01 MORBID OBESITY WITH BMI OF 40.0-44.9, ADULT (HCC): ICD-10-CM

## 2024-08-13 DIAGNOSIS — T39.95XA ANALGESIC REBOUND HEADACHE: ICD-10-CM

## 2024-08-13 DIAGNOSIS — G44.40 ANALGESIC REBOUND HEADACHE: ICD-10-CM

## 2024-08-13 DIAGNOSIS — F41.0 PANIC ATTACKS: ICD-10-CM

## 2024-08-13 PROCEDURE — 99214 OFFICE O/P EST MOD 30 MIN: CPT | Performed by: FAMILY MEDICINE

## 2024-08-13 PROCEDURE — G8417 CALC BMI ABV UP PARAM F/U: HCPCS | Performed by: FAMILY MEDICINE

## 2024-08-13 PROCEDURE — G8427 DOCREV CUR MEDS BY ELIG CLIN: HCPCS | Performed by: FAMILY MEDICINE

## 2024-08-13 PROCEDURE — 1036F TOBACCO NON-USER: CPT | Performed by: FAMILY MEDICINE

## 2024-08-13 RX ORDER — RIBOFLAVIN (VITAMIN B2) 400 MG
1 TABLET ORAL DAILY
Qty: 30 TABLET | Refills: 2 | Status: SHIPPED | OUTPATIENT
Start: 2024-08-13

## 2024-08-13 RX ORDER — SUMATRIPTAN 50 MG/1
TABLET, FILM COATED ORAL
Qty: 18 TABLET | Refills: 3 | Status: SHIPPED | OUTPATIENT
Start: 2024-08-13

## 2024-08-13 RX ORDER — HYDROXYZINE PAMOATE 25 MG/1
25 CAPSULE ORAL 3 TIMES DAILY PRN
Qty: 90 CAPSULE | Refills: 0 | Status: SHIPPED | OUTPATIENT
Start: 2024-08-13

## 2024-08-13 RX ORDER — TRIAMCINOLONE ACETONIDE 1 MG/G
CREAM TOPICAL
Qty: 45 G | Refills: 0 | Status: SHIPPED | OUTPATIENT
Start: 2024-08-13

## 2024-08-13 RX ORDER — MAGNESIUM OXIDE 400 MG/1
1 TABLET ORAL DAILY
Qty: 90 TABLET | Refills: 3 | Status: SHIPPED | OUTPATIENT
Start: 2024-08-13

## 2024-08-13 RX ORDER — ESCITALOPRAM OXALATE 20 MG/1
20 TABLET ORAL DAILY
Qty: 90 TABLET | Refills: 3 | Status: SHIPPED | OUTPATIENT
Start: 2024-08-13

## 2024-09-23 DIAGNOSIS — G43.109 MIGRAINE WITH AURA AND WITHOUT STATUS MIGRAINOSUS, NOT INTRACTABLE: ICD-10-CM

## 2024-09-23 RX ORDER — SUMATRIPTAN 50 MG/1
TABLET, FILM COATED ORAL
Qty: 18 TABLET | Refills: 3 | Status: SHIPPED | OUTPATIENT
Start: 2024-09-23

## 2025-03-24 ENCOUNTER — HOSPITAL ENCOUNTER (OUTPATIENT)
Dept: WOMENS IMAGING | Age: 40
Discharge: HOME | End: 2025-03-24
Attending: FAMILY MEDICINE
Payer: COMMERCIAL

## 2025-03-24 ENCOUNTER — RESULTS FOLLOW-UP (OUTPATIENT)
Dept: FAMILY MEDICINE CLINIC | Age: 40
End: 2025-03-24

## 2025-03-24 VITALS — HEIGHT: 67 IN | BODY MASS INDEX: 42.38 KG/M2 | WEIGHT: 270 LBS

## 2025-03-24 DIAGNOSIS — Z12.31 ENCOUNTER FOR SCREENING MAMMOGRAM FOR MALIGNANT NEOPLASM OF BREAST: ICD-10-CM

## 2025-03-24 PROCEDURE — 77063 BREAST TOMOSYNTHESIS BI: CPT

## 2025-03-25 NOTE — TELEPHONE ENCOUNTER
Pt informed of mammogram results . Pt voiced understanding with no further questions at this time.

## 2025-04-07 SDOH — ECONOMIC STABILITY: FOOD INSECURITY: WITHIN THE PAST 12 MONTHS, THE FOOD YOU BOUGHT JUST DIDN'T LAST AND YOU DIDN'T HAVE MONEY TO GET MORE.: NEVER TRUE

## 2025-04-07 SDOH — ECONOMIC STABILITY: FOOD INSECURITY: WITHIN THE PAST 12 MONTHS, YOU WORRIED THAT YOUR FOOD WOULD RUN OUT BEFORE YOU GOT MONEY TO BUY MORE.: NEVER TRUE

## 2025-04-07 SDOH — ECONOMIC STABILITY: INCOME INSECURITY: IN THE LAST 12 MONTHS, WAS THERE A TIME WHEN YOU WERE NOT ABLE TO PAY THE MORTGAGE OR RENT ON TIME?: NO

## 2025-04-07 SDOH — ECONOMIC STABILITY: TRANSPORTATION INSECURITY
IN THE PAST 12 MONTHS, HAS THE LACK OF TRANSPORTATION KEPT YOU FROM MEDICAL APPOINTMENTS OR FROM GETTING MEDICATIONS?: NO

## 2025-04-07 ASSESSMENT — PATIENT HEALTH QUESTIONNAIRE - PHQ9
SUM OF ALL RESPONSES TO PHQ QUESTIONS 1-9: 0
1. LITTLE INTEREST OR PLEASURE IN DOING THINGS: NOT AT ALL
SUM OF ALL RESPONSES TO PHQ QUESTIONS 1-9: 0
2. FEELING DOWN, DEPRESSED OR HOPELESS: NOT AT ALL
1. LITTLE INTEREST OR PLEASURE IN DOING THINGS: NOT AT ALL
2. FEELING DOWN, DEPRESSED OR HOPELESS: NOT AT ALL
SUM OF ALL RESPONSES TO PHQ9 QUESTIONS 1 & 2: 0
SUM OF ALL RESPONSES TO PHQ QUESTIONS 1-9: 0
SUM OF ALL RESPONSES TO PHQ QUESTIONS 1-9: 0

## 2025-04-07 NOTE — PATIENT INSTRUCTIONS
LAB INSTRUCTIONS:    Please complete labs within 2 week(s).    Please fast for 8 hours prior to lab collection.    The clinic will call you within 1 week of collection. If you have not heard from us within that amount of time, please call us at [phone].

## 2025-04-07 NOTE — PROGRESS NOTES
circular rash on distal dorsum of R foot.   Psych: Affect appropriate. Mood euthymic. Thought process is normal without evidence of depression or psychosis. Good insight and appropriate interaction.  Cognition and memory appear to be intact.      ASSESSMENT & PLAN  1. GABRIELA (generalized anxiety disorder)    Stable  Con't Lexapro/prn vistaril  F/u 6 months    2. Panic attacks    As above    3. Migraine with aura and without status migrainosus, not intractable    Stable  Con't Mg++/Riboflavin  Con't prn imitrex    4. Morbid obesity with BMI of 40.0-44.9, adult    Discussed wt loss strategies.     - CBC with Auto Differential; Future  - Comprehensive Metabolic Panel; Future  - Lipid Panel; Future  - TSH reflex to FT4; Future    5. Gastroesophageal reflux disease, unspecified whether esophagitis present    Stable  Con't Omeprazole      DISPOSITION    Return in about 6 months (around 10/8/2025) for follow-up on chronic medical conditions, sooner as needed.    Kari released without restrictions.    PATIENT COUNSELING    Counseling was provided today regarding the following topics: Healthy eating habits, Regular exercise, substance abuse and healthy sleep habits.    Barriers to learning and self management: none    Discussed use, benefit, and side effects of prescribed medications.  Barriers to medication compliance addressed.  All patient questions answered.  Pt voiced understanding.       Electronically signed by Israel Diggs DO on 4/8/2025 at 9:25 AM

## 2025-04-08 ENCOUNTER — OFFICE VISIT (OUTPATIENT)
Dept: FAMILY MEDICINE CLINIC | Age: 40
End: 2025-04-08
Payer: COMMERCIAL

## 2025-04-08 VITALS
OXYGEN SATURATION: 98 % | RESPIRATION RATE: 16 BRPM | HEART RATE: 68 BPM | HEIGHT: 67 IN | SYSTOLIC BLOOD PRESSURE: 130 MMHG | WEIGHT: 270.6 LBS | BODY MASS INDEX: 42.47 KG/M2 | TEMPERATURE: 97.2 F | DIASTOLIC BLOOD PRESSURE: 70 MMHG

## 2025-04-08 DIAGNOSIS — E66.01 MORBID OBESITY WITH BMI OF 40.0-44.9, ADULT: ICD-10-CM

## 2025-04-08 DIAGNOSIS — G43.109 MIGRAINE WITH AURA AND WITHOUT STATUS MIGRAINOSUS, NOT INTRACTABLE: ICD-10-CM

## 2025-04-08 DIAGNOSIS — F41.0 PANIC ATTACKS: ICD-10-CM

## 2025-04-08 DIAGNOSIS — F41.1 GAD (GENERALIZED ANXIETY DISORDER): Primary | Chronic | ICD-10-CM

## 2025-04-08 DIAGNOSIS — K21.9 GASTROESOPHAGEAL REFLUX DISEASE, UNSPECIFIED WHETHER ESOPHAGITIS PRESENT: ICD-10-CM

## 2025-04-08 PROCEDURE — 99214 OFFICE O/P EST MOD 30 MIN: CPT | Performed by: FAMILY MEDICINE

## 2025-04-10 ENCOUNTER — LAB (OUTPATIENT)
Dept: LAB | Age: 40
End: 2025-04-10

## 2025-04-10 ENCOUNTER — RESULTS FOLLOW-UP (OUTPATIENT)
Dept: FAMILY MEDICINE CLINIC | Age: 40
End: 2025-04-10

## 2025-04-10 DIAGNOSIS — E66.01 MORBID OBESITY WITH BMI OF 40.0-44.9, ADULT: ICD-10-CM

## 2025-04-10 LAB
ALBUMIN SERPL BCG-MCNC: 3.9 G/DL (ref 3.4–4.9)
ALP SERPL-CCNC: 64 U/L (ref 38–126)
ALT SERPL W/O P-5'-P-CCNC: 18 U/L (ref 10–35)
ANION GAP SERPL CALC-SCNC: 10 MEQ/L (ref 8–16)
AST SERPL-CCNC: 14 U/L (ref 10–35)
BASOPHILS ABSOLUTE: 0.1 THOU/MM3 (ref 0–0.1)
BASOPHILS NFR BLD AUTO: 0.8 %
BILIRUB SERPL-MCNC: 0.5 MG/DL (ref 0.3–1.2)
BUN SERPL-MCNC: 14 MG/DL (ref 8–23)
CALCIUM SERPL-MCNC: 9.2 MG/DL (ref 8.6–10)
CHLORIDE SERPL-SCNC: 106 MEQ/L (ref 98–111)
CHOLEST SERPL-MCNC: 187 MG/DL (ref 100–199)
CO2 SERPL-SCNC: 23 MEQ/L (ref 22–29)
CREAT SERPL-MCNC: 0.6 MG/DL (ref 0.5–0.9)
DEPRECATED RDW RBC AUTO: 41.5 FL (ref 35–45)
EOSINOPHIL NFR BLD AUTO: 0.6 %
EOSINOPHILS ABSOLUTE: 0 THOU/MM3 (ref 0–0.4)
ERYTHROCYTE [DISTWIDTH] IN BLOOD BY AUTOMATED COUNT: 12.7 % (ref 11.5–14.5)
GFR SERPL CREATININE-BSD FRML MDRD: > 90 ML/MIN/1.73M2
GLUCOSE SERPL-MCNC: 101 MG/DL (ref 74–109)
HCT VFR BLD AUTO: 42.3 % (ref 37–47)
HDLC SERPL-MCNC: 59 MG/DL
HGB BLD-MCNC: 14.2 GM/DL (ref 12–16)
IMM GRANULOCYTES # BLD AUTO: 0.02 THOU/MM3 (ref 0–0.07)
IMM GRANULOCYTES NFR BLD AUTO: 0.3 %
LDLC SERPL CALC-MCNC: 115 MG/DL
LYMPHOCYTES ABSOLUTE: 1.3 THOU/MM3 (ref 1–4.8)
LYMPHOCYTES NFR BLD AUTO: 20.4 %
MCH RBC QN AUTO: 30.3 PG (ref 26–33)
MCHC RBC AUTO-ENTMCNC: 33.6 GM/DL (ref 32.2–35.5)
MCV RBC AUTO: 90.2 FL (ref 81–99)
MONOCYTES ABSOLUTE: 0.6 THOU/MM3 (ref 0.4–1.3)
MONOCYTES NFR BLD AUTO: 8.8 %
NEUTROPHILS ABSOLUTE: 4.4 THOU/MM3 (ref 1.8–7.7)
NEUTROPHILS NFR BLD AUTO: 69.1 %
NRBC BLD AUTO-RTO: 0 /100 WBC
PLATELET # BLD AUTO: 282 THOU/MM3 (ref 130–400)
PMV BLD AUTO: 10.9 FL (ref 9.4–12.4)
POTASSIUM SERPL-SCNC: 4.1 MEQ/L (ref 3.5–5.2)
PROT SERPL-MCNC: 5.9 G/DL (ref 6.4–8.3)
RBC # BLD AUTO: 4.69 MILL/MM3 (ref 4.2–5.4)
SODIUM SERPL-SCNC: 139 MEQ/L (ref 135–145)
TRIGL SERPL-MCNC: 66 MG/DL (ref 0–199)
TSH SERPL DL<=0.05 MIU/L-ACNC: 2.37 UIU/ML (ref 0.27–4.2)
WBC # BLD AUTO: 6.3 THOU/MM3 (ref 4.8–10.8)

## 2025-04-11 NOTE — TELEPHONE ENCOUNTER
Left detailed message regarding lab results. Okay per HIPAA. Requested call back at [phone]  if they have any further questions.

## 2025-05-19 DIAGNOSIS — G43.109 MIGRAINE WITH AURA AND WITHOUT STATUS MIGRAINOSUS, NOT INTRACTABLE: ICD-10-CM

## 2025-05-19 RX ORDER — SUMATRIPTAN 50 MG/1
TABLET, FILM COATED ORAL
Qty: 18 TABLET | Refills: 3 | Status: SHIPPED | OUTPATIENT
Start: 2025-05-19

## 2025-05-19 NOTE — TELEPHONE ENCOUNTER
Recent Visits  Date Type Provider Dept   04/08/25 Office Visit Israel Diggs, DO [facility]   08/13/24 Office Visit Israel Diggs, DO [facility]   12/13/23 Office Visit Israel Diggs, DO [facility]   Showing recent visits within past 540 days with a meds authorizing provider and meeting all other requirements  Future Appointments  No visits were found meeting these conditions.  Showing future appointments within next 150 days with a meds authorizing provider and meeting all other requirements

## 2025-08-21 DIAGNOSIS — G44.40 ANALGESIC REBOUND HEADACHE: ICD-10-CM

## 2025-08-21 DIAGNOSIS — G43.109 MIGRAINE WITH AURA AND WITHOUT STATUS MIGRAINOSUS, NOT INTRACTABLE: ICD-10-CM

## 2025-08-21 DIAGNOSIS — T39.95XA ANALGESIC REBOUND HEADACHE: ICD-10-CM

## 2025-08-21 RX ORDER — MAGNESIUM OXIDE 400 MG/1
1 TABLET ORAL DAILY
Qty: 90 TABLET | Refills: 3 | Status: SHIPPED | OUTPATIENT
Start: 2025-08-21